# Patient Record
Sex: FEMALE | Race: WHITE
[De-identification: names, ages, dates, MRNs, and addresses within clinical notes are randomized per-mention and may not be internally consistent; named-entity substitution may affect disease eponyms.]

---

## 2017-09-19 ENCOUNTER — HOSPITAL ENCOUNTER (OUTPATIENT)
Dept: HOSPITAL 47 - LABWHC1 | Age: 27
Discharge: HOME | End: 2017-09-19
Payer: COMMERCIAL

## 2017-09-19 DIAGNOSIS — O26.811: Primary | ICD-10-CM

## 2017-09-19 DIAGNOSIS — Z3A.00: ICD-10-CM

## 2017-09-19 LAB
CH: 29.4
CHCM: 34
ERYTHROCYTE [DISTWIDTH] IN BLOOD BY AUTOMATED COUNT: 4.38 M/UL (ref 3.8–5.4)
ERYTHROCYTE [DISTWIDTH] IN BLOOD: 13.8 % (ref 11.5–15.5)
GLUCOSE SERPL-MCNC: 79 MG/DL (ref 74–99)
HCT VFR BLD AUTO: 38.1 % (ref 34–46)
HDW: 2.21
HEPATITIS B SURFACE AG INDEX: 0.05
HGB BLD-MCNC: 12.9 GM/DL (ref 11.4–16)
MCH RBC QN AUTO: 29.5 PG (ref 25–35)
MCHC RBC AUTO-ENTMCNC: 34 G/DL (ref 31–37)
MCV RBC AUTO: 87 FL (ref 80–100)
NON-AFRICAN AMERICAN GFR(MDRD): >60
WBC # BLD AUTO: 9.2 K/UL (ref 3.8–10.6)

## 2017-09-19 PROCEDURE — 85027 COMPLETE CBC AUTOMATED: CPT

## 2017-09-19 PROCEDURE — 86901 BLOOD TYPING SEROLOGIC RH(D): CPT

## 2017-09-19 PROCEDURE — 86777 TOXOPLASMA ANTIBODY: CPT

## 2017-09-19 PROCEDURE — 36415 COLL VENOUS BLD VENIPUNCTURE: CPT

## 2017-09-19 PROCEDURE — 86762 RUBELLA ANTIBODY: CPT

## 2017-09-19 PROCEDURE — 86850 RBC ANTIBODY SCREEN: CPT

## 2017-09-19 PROCEDURE — 86778 TOXOPLASMA ANTIBODY IGM: CPT

## 2017-09-19 PROCEDURE — 82565 ASSAY OF CREATININE: CPT

## 2017-09-19 PROCEDURE — 87390 HIV-1 AG IA: CPT

## 2017-09-19 PROCEDURE — 86780 TREPONEMA PALLIDUM: CPT

## 2017-09-19 PROCEDURE — 86900 BLOOD TYPING SEROLOGIC ABO: CPT

## 2017-09-19 PROCEDURE — 87340 HEPATITIS B SURFACE AG IA: CPT

## 2017-09-19 PROCEDURE — 82947 ASSAY GLUCOSE BLOOD QUANT: CPT

## 2017-09-20 LAB — T GONDII IGG SER-ACNC: <3 IU/ML (ref ?–7.2)

## 2018-01-19 ENCOUNTER — HOSPITAL ENCOUNTER (OUTPATIENT)
Dept: HOSPITAL 47 - LABWHC1 | Age: 28
Discharge: HOME | End: 2018-01-19
Payer: COMMERCIAL

## 2018-01-19 DIAGNOSIS — Z34.02: Primary | ICD-10-CM

## 2018-01-19 LAB
ERYTHROCYTE [DISTWIDTH] IN BLOOD BY AUTOMATED COUNT: 3.72 M/UL (ref 3.8–5.4)
ERYTHROCYTE [DISTWIDTH] IN BLOOD: 13.8 % (ref 11.5–15.5)
HCT VFR BLD AUTO: 32.3 % (ref 34–46)
HGB BLD-MCNC: 10.6 GM/DL (ref 11.4–16)
MCH RBC QN AUTO: 28.5 PG (ref 25–35)
MCHC RBC AUTO-ENTMCNC: 32.8 G/DL (ref 31–37)
MCV RBC AUTO: 86.8 FL (ref 80–100)
PLATELET # BLD AUTO: 313 K/UL (ref 150–450)
WBC # BLD AUTO: 11.6 K/UL (ref 3.8–10.6)

## 2018-01-19 PROCEDURE — 85027 COMPLETE CBC AUTOMATED: CPT

## 2018-01-19 PROCEDURE — 82950 GLUCOSE TEST: CPT

## 2018-01-19 PROCEDURE — 36415 COLL VENOUS BLD VENIPUNCTURE: CPT

## 2018-04-26 ENCOUNTER — HOSPITAL ENCOUNTER (INPATIENT)
Dept: HOSPITAL 47 - 4FBP | Age: 28
LOS: 2 days | Discharge: HOME | End: 2018-04-28
Payer: COMMERCIAL

## 2018-04-26 VITALS — BODY MASS INDEX: 28.3 KG/M2

## 2018-04-26 DIAGNOSIS — Z3A.39: ICD-10-CM

## 2018-04-26 DIAGNOSIS — Z87.19: ICD-10-CM

## 2018-04-26 DIAGNOSIS — O35.8XX0: ICD-10-CM

## 2018-04-26 DIAGNOSIS — Z90.89: ICD-10-CM

## 2018-04-26 LAB
BASOPHILS # BLD AUTO: 0 K/UL (ref 0–0.2)
BASOPHILS NFR BLD AUTO: 0 %
EOSINOPHIL # BLD AUTO: 0.1 K/UL (ref 0–0.7)
EOSINOPHIL NFR BLD AUTO: 1 %
ERYTHROCYTE [DISTWIDTH] IN BLOOD BY AUTOMATED COUNT: 4.28 M/UL (ref 3.8–5.4)
ERYTHROCYTE [DISTWIDTH] IN BLOOD: 13.6 % (ref 11.5–15.5)
HCT VFR BLD AUTO: 35.3 % (ref 34–46)
HGB BLD-MCNC: 11.9 GM/DL (ref 11.4–16)
LYMPHOCYTES # SPEC AUTO: 0.7 K/UL (ref 1–4.8)
LYMPHOCYTES NFR SPEC AUTO: 7 %
MCH RBC QN AUTO: 27.9 PG (ref 25–35)
MCHC RBC AUTO-ENTMCNC: 33.8 G/DL (ref 31–37)
MCV RBC AUTO: 82.5 FL (ref 80–100)
MONOCYTES # BLD AUTO: 0.6 K/UL (ref 0–1)
MONOCYTES NFR BLD AUTO: 6 %
NEUTROPHILS # BLD AUTO: 7.8 K/UL (ref 1.3–7.7)
NEUTROPHILS NFR BLD AUTO: 84 %
PLATELET # BLD AUTO: 296 K/UL (ref 150–450)
WBC # BLD AUTO: 9.4 K/UL (ref 3.8–10.6)

## 2018-04-26 PROCEDURE — 85025 COMPLETE CBC W/AUTO DIFF WBC: CPT

## 2018-04-26 PROCEDURE — 3E033VJ INTRODUCTION OF OTHER HORMONE INTO PERIPHERAL VEIN, PERCUTANEOUS APPROACH: ICD-10-PCS

## 2018-04-26 PROCEDURE — 88307 TISSUE EXAM BY PATHOLOGIST: CPT

## 2018-04-26 PROCEDURE — 10907ZC DRAINAGE OF AMNIOTIC FLUID, THERAPEUTIC FROM PRODUCTS OF CONCEPTION, VIA NATURAL OR ARTIFICIAL OPENING: ICD-10-PCS

## 2018-04-26 PROCEDURE — 3E0R3NZ INTRODUCTION OF ANALGESICS, HYPNOTICS, SEDATIVES INTO SPINAL CANAL, PERCUTANEOUS APPROACH: ICD-10-PCS

## 2018-04-26 PROCEDURE — 00HU33Z INSERTION OF INFUSION DEVICE INTO SPINAL CANAL, PERCUTANEOUS APPROACH: ICD-10-PCS

## 2018-04-26 RX ADMIN — POTASSIUM CHLORIDE SCH MLS/HR: 14.9 INJECTION, SOLUTION INTRAVENOUS at 11:23

## 2018-04-26 RX ADMIN — IBUPROFEN PRN MG: 600 TABLET ORAL at 17:28

## 2018-04-26 RX ADMIN — POTASSIUM CHLORIDE SCH MLS/HR: 14.9 INJECTION, SOLUTION INTRAVENOUS at 06:18

## 2018-04-26 RX ADMIN — POTASSIUM CHLORIDE SCH MLS/HR: 14.9 INJECTION, SOLUTION INTRAVENOUS at 12:06

## 2018-04-26 RX ADMIN — DOCUSATE SODIUM AND SENNOSIDES SCH EACH: 50; 8.6 TABLET ORAL at 22:03

## 2018-04-26 RX ADMIN — IBUPROFEN PRN MG: 600 TABLET ORAL at 23:30

## 2018-04-26 NOTE — P.HPOB
History of Present Illness


H&P Date: 18


Chief Complaint: Induction of Labor





27 year old  presents at 39 weeks 6 days for induction of labor. Her cervix 

is 2-3/70/-2 and she is josue irregularly. Fetal heart tones 130-135 with 

moderate variability and reactive.





Review of Systems


All systems: negative


Constitutional: Denies chills, Denies fever


Eyes: denies blurred vision, denies pain


Ears, nose, mouth and throat: Denies headache, Denies sore throat


Cardiovascular: Denies chest pain, Denies shortness of breath


Respiratory: Denies cough


Gastrointestinal: Denies abdominal pain, Denies diarrhea, Denies nausea, Denies 

vomiting


Genitourinary: Denies dysuria, Denies hematuria


Musculoskeletal: Denies myalgias


Integumentary: Denies pruritus, Denies rash


Neurological: Denies numbness, Denies weakness


Psychiatric: Denies anxiety, Denies depression


Endocrine: Denies fatigue, Denies weight change





Past Medical History


Past Medical History: No Reported History


Additional Past Medical History / Comment(s): OBstetric history: This is her 

first pregnancy and she has had prenatal care with me since 9 weeks. A+, abs neg

, Rub Imm, Treponemal ab neg, Hep B neg, HIV NR, toxo neg.  Baby does have BL 

club feet. normal maternity 21 testing. male.  normal fetal echo.  GBS neg.


History of Any Multi-Drug Resistant Organisms: None Reported


Past Surgical History: Adenoidectomy, Hernia Repair, Tonsillectomy


Additional Past Surgical History / Comment(s): wisdom teeth


Past Anesthesia/Blood Transfusion Reactions: No Reported Reaction


Past Psychological History: No Psychological Hx Reported


Smoking Status: Never smoker


Past Alcohol Use History: None Reported


Past Drug Use History: None Reported





- Past Family History


  ** Mother


Family Medical History: No Reported History





Medications and Allergies


 Home Medications











 Medication  Instructions  Recorded  Confirmed  Type


 


Pnv,Calcium 72/Iron/Folic Acid 1 tab PO DAILY 18 History





[Prenatal Plus Tablet]    











 Allergies











Allergy/AdvReac Type Severity Reaction Status Date / Time


 


No Known Allergies Allergy   Verified 18 05:54














Exam


Osteopathic Statement: *.  No significant issues noted on an osteopathic 

structural exam other than those noted in the History and Physical/Consult.





- Vital Signs


Vital signs: 


 Vital Signs











  Temp Pulse Resp BP Pulse Ox


 


 18 05:54  98.2 F  126 H  16  100/59  99








 Intake and Output











 18





 22:59 06:59 14:59


 


Other:   


 


  # Voids  1 


 


  Weight  70.307 kg 














Heart: RRR


Lungs: CTAB


Abdomen: soft, nontender


Extremeties: neg sarah beth's





Results


Result Diagrams: 


 18 05:55





 Abnormal Lab Results - Last 24 Hours (Table)











  18 Range/Units





  05:55 


 


Neutrophils #  7.8 H  (1.3-7.7)  k/uL


 


Lymphocytes #  0.7 L  (1.0-4.8)  k/uL














Assessment and Plan


(1) Normal labor


Current Visit: Yes   Status: Acute   Code(s): O80 - ENCOUNTER FOR FULL-TERM 

UNCOMPLICATED DELIVERY; Z37.9 - OUTCOME OF DELIVERY, UNSPECIFIED   SNOMED Code(s

): 64417241


   





(2) Club foot, fetal, affecting care of mother, antepartum


Current Visit: Yes   Status: Acute   Code(s): O35.8XX0 - MATERNAL CARE FOR OTH 

FETAL ABNORMALITY AND DAMAGE, UNSP   SNOMED Code(s): 708775099


   


Plan: 





1. induction of labor with amniotomy and pitocin


2. anticipate normal vaginal delivery

## 2018-04-27 RX ADMIN — DOCUSATE SODIUM AND SENNOSIDES SCH EACH: 50; 8.6 TABLET ORAL at 09:13

## 2018-04-27 RX ADMIN — IBUPROFEN PRN MG: 600 TABLET ORAL at 22:28

## 2018-04-27 RX ADMIN — IBUPROFEN PRN MG: 600 TABLET ORAL at 09:14

## 2018-04-27 NOTE — P.PROBDLV
Vaginal Delivery Note





- .


Vaginal Delivery Note: 





27-year-old  presents at 39 weeks and 6 days for induction of labor.  Her 

cervix was 3 cm dilated, 80% effaced, -2 station.  She is josue 

irregularly.  Fetal heart tones 130-135 with moderate variability and reactive.

  Pitocin was started.  Amniotomy was performed at 7:50 AM, thick meconium 

fluid was noted.  When she was uncomfortable and made some cervical change she 

did get an epidural.  Her cervix was completely dilated at 1408.  She labored 

down until 1445 and then pushed, delivered a viable male infant over midline 

episiotomy under epidural anesthesia at 1646.  Head delivered OA, nuchal cord 

1 easily reduced, anterior shoulder delivered gentle downward traction for by 

posterior shoulder and rest of body.  Infant attempted to spontaneously cry, 

nose and mouth were vigorously bulb suctioned, cord clamped and cut, infant 

placed on mother's abdomen.  Apgars 8, 9, weight 6 lbs. 13 oz.  Placenta 

delivered spontaneously, intact with three-vessel cord at 1649.  Vagina, cervix

, and perineum were inspected.  Secondary midline episiotomy was repaired with 2

-0 and 3-0 Vicryl.  Estimated blood loss 200 mL.  Mother and baby in stable 

condition.

## 2018-04-27 NOTE — P.PNOBGVD
Subjective





- Subjective


Principal diagnosis: S/ NVD PPD #1


Interval history: 





Patient seen and examined.  Denies nausea, vomiting, chest pain, shortness of 

breath or calf pain.


Patient reports: Reports appetite normal, Reports voiding normally, Reports 

pain well controlled, Reports ambulating normally





Objective





- Latest Vital Signs


Latest vital signs: 


 Vital Signs











  Temp Pulse Resp BP Pulse Ox


 


 04/27/18 04:00  98.0 F  81  16  120/67 


 


 04/27/18 00:00  97.8 F  82  16  104/62 


 


 04/26/18 19:00  98.6 F  95  18  108/66 


 


 04/26/18 18:30  99.5 F  89  17  105/52  100


 


 04/26/18 18:00  99.7 F H  73  18  104/51  100


 


 04/26/18 17:45  100.3 F H  91  17  98/61 


 


 04/26/18 17:30  99.1 F  103 H  18  101/57 


 


 04/26/18 17:15   96  17  96/55 


 


 04/26/18 17:00   79  18  117/58 








 Intake and Output











 04/26/18 04/27/18 04/27/18





 22:59 06:59 14:59


 


Intake Total 1000.0 1600 


 


Balance 1000.0 1600 


 


Intake:   


 


  Intake, IV Titration 1000.0 1000 





  Amount   


 


    Oxytocin 20 Units/1000 ml 1000.0  





    Ns 1,000 ml @ 1   





    MILLIUNIT/MIN 3 mls/hr IV   





    .Q24H [facility] Rx#:[id number]   


 


    Oxytocin 20 Units/1000 ml  1000 





    Ns 1,000 ml @ Per   





    Protocol IV .Q0M [facility] Rx#:   





    [id number]   


 


  Oral  600 


 


Other:   


 


  # Voids 1 2 














- Exam


Lungs: bilateral: normal


Chest: Normal S1, Normal S2


Extremities: Present: normal


Abdomen: Present: normal appearance, soft


Uterus: Present: normal, firm





Assessment and Plan


(1) Normal labor


Current Visit: Yes   Status: Resolved   Code(s): O80 - ENCOUNTER FOR FULL-TERM 

UNCOMPLICATED DELIVERY; Z37.9 - OUTCOME OF DELIVERY, UNSPECIFIED   SNOMED Code(s

): 29375105


   





(2) Club foot, fetal, affecting care of mother, antepartum


Current Visit: Yes   Status: Resolved   Code(s): O35.8XX0 - MATERNAL CARE FOR 

OTH FETAL ABNORMALITY AND DAMAGE, UNSP   SNOMED Code(s): 920981897


   





(3) Normal vaginal delivery


Current Visit: Yes   Status: Acute   Code(s): O80 - ENCOUNTER FOR FULL-TERM 

UNCOMPLICATED DELIVERY   SNOMED Code(s): 24381980


   


Plan: 


1.  Continue postpartum care

## 2018-04-28 VITALS — TEMPERATURE: 98.1 F | HEART RATE: 96 BPM | DIASTOLIC BLOOD PRESSURE: 59 MMHG | SYSTOLIC BLOOD PRESSURE: 105 MMHG

## 2018-04-28 VITALS — RESPIRATION RATE: 16 BRPM

## 2018-04-28 RX ADMIN — DOCUSATE SODIUM AND SENNOSIDES SCH: 50; 8.6 TABLET ORAL at 13:44

## 2018-04-28 RX ADMIN — IBUPROFEN PRN MG: 600 TABLET ORAL at 04:34

## 2018-04-28 RX ADMIN — IBUPROFEN PRN MG: 600 TABLET ORAL at 11:02

## 2018-04-28 RX ADMIN — DOCUSATE SODIUM AND SENNOSIDES SCH: 50; 8.6 TABLET ORAL at 00:47
